# Patient Record
(demographics unavailable — no encounter records)

---

## 2025-06-19 NOTE — HISTORY OF PRESENT ILLNESS
[FreeTextEntry1] : Sarah Beth Mcelroy is a 31 yo F with history of bipolar disorder, PKD, polysubstance abuse and HTN, seen for f/u of kidney function.    09/26/24:  Renal function remains stable, presently sCr 0.8 with eGFR 100.  No proteinuria noted on her labwork.  She does have uncontrolled TriG, presently at 272 on her labwork.  She also notes she stopped her Lisinopril due to concerns with interactions with two new bipolar medications she was started on.  Recent Hospitalizations: none  Recent Medications changes: Abilify and Seroquel was started NSAIDS use: denies use  Home BP: no abnormal readings noted  Home FSBS:  no abnormal readings noted  LUTS: denies LUTS, foamy urine or hematuria  Uremic Symptoms: no pruritis, metallic taste, new onset weakness, dysphagia, poor appetite, or tremors noted  FamHx CKD/RRT:  denies  Personal History of CKD/RRT:  denies

## 2025-06-19 NOTE — ASSESSMENT
[FreeTextEntry1] : #) CKD stage G2A1: Likely etiology related to PKD with well preserved baseline renal function - goal intensive BP goal of <110/70 - will monitor acid base; no bicarbonate supplementation is required at current HCO3 - continue to monitor urine studies for proteinuria with U/A and urine protein quantification studies - continue to monitor and treat hypercholesterolemia to goal LDL <100  #) ADPKD: Based on her fathers history, likely she has PKD type 1 - Renal imaging noted multiple left sided renal cysts, likely atypical PKD - restarting Lisinopril 5mg nightly before bed - due to atypical presentation of her cysts, she would not be a candidate for Jynarque therapy - advised high volume water intake of 2-2.5 L daily  #) Hypertriglyceridemia TriG 272->195, improved with dietary reduction in sugar - will repeat Lipid Panel prior to next appointment  #) LUQ abdominal pain Reproducible on exam today Not associated with hematuria, constipation or diarrhea - will order non-contrast CT Abdomen to rule out intestinal or splenic pathology, or possible broken rib